# Patient Record
Sex: FEMALE | Race: OTHER | NOT HISPANIC OR LATINO
[De-identification: names, ages, dates, MRNs, and addresses within clinical notes are randomized per-mention and may not be internally consistent; named-entity substitution may affect disease eponyms.]

---

## 2019-02-12 ENCOUNTER — TRANSCRIPTION ENCOUNTER (OUTPATIENT)
Age: 64
End: 2019-02-12

## 2023-04-20 ENCOUNTER — APPOINTMENT (OUTPATIENT)
Dept: RHEUMATOLOGY | Facility: CLINIC | Age: 68
End: 2023-04-20
Payer: COMMERCIAL

## 2023-04-20 VITALS — SYSTOLIC BLOOD PRESSURE: 120 MMHG | DIASTOLIC BLOOD PRESSURE: 80 MMHG

## 2023-04-20 VITALS — WEIGHT: 160 LBS | BODY MASS INDEX: 26.66 KG/M2 | HEART RATE: 99 BPM | OXYGEN SATURATION: 98 % | HEIGHT: 65 IN

## 2023-04-20 DIAGNOSIS — G89.29 PAIN IN UNSPECIFIED HIP: ICD-10-CM

## 2023-04-20 DIAGNOSIS — Z82.61 FAMILY HISTORY OF ARTHRITIS: ICD-10-CM

## 2023-04-20 DIAGNOSIS — Z86.39 PERSONAL HISTORY OF OTHER ENDOCRINE, NUTRITIONAL AND METABOLIC DISEASE: ICD-10-CM

## 2023-04-20 DIAGNOSIS — M25.559 PAIN IN UNSPECIFIED HIP: ICD-10-CM

## 2023-04-20 DIAGNOSIS — Z78.9 OTHER SPECIFIED HEALTH STATUS: ICD-10-CM

## 2023-04-20 PROBLEM — Z00.00 ENCOUNTER FOR PREVENTIVE HEALTH EXAMINATION: Status: ACTIVE | Noted: 2023-04-20

## 2023-04-20 PROCEDURE — 99204 OFFICE O/P NEW MOD 45 MIN: CPT

## 2023-04-20 RX ORDER — ROSUVASTATIN CALCIUM 5 MG/1
5 TABLET, FILM COATED ORAL
Refills: 0 | Status: ACTIVE | COMMUNITY

## 2023-04-20 RX ORDER — VALSARTAN AND HYDROCHLOROTHIAZIDE 80; 12.5 MG/1; MG/1
80-12.5 TABLET, FILM COATED ORAL
Refills: 0 | Status: ACTIVE | COMMUNITY

## 2023-04-20 NOTE — HISTORY OF PRESENT ILLNESS
[FreeTextEntry1] : Around January 2023, pt began to experience episodes of severe pain in her low back radiating down both of her posterior legs simultaneously, which would improve after a few minutes if she stops to rest but have been becoming more frequent and occur several times daily. Sometimes trouble bending down due to the pain. The symptoms are worse with using stairs and being on pt's feet a lot. Also + pain in lateral thighs when pt turns in bed. + Intermittent muscle pains in proximal arms but those symptoms are not related to the legs and relatively mild. Pt denies numbness or tingling, swelling, diarrhea, rashes or eye problems. No known triggers for the symptoms.\par \par Previous treatments:\par - Advil 400 mg\par - BenGay\par - Tylenol\par \par Physical exam: GEN: Pleasant, AAO woman sitting on exam table in NAD\par HEAD: no alopecia\par SKIN: No rashes\par MOUTH: Moist mucous membranes, no oral ulcers, normal oral aperture\par PULM: Clear to auscultation b/l\par ENT: No LAD\par PULM: Clear to auscultation b/l\par CV: Regular rate and rhythm, no murmurs\par MSK:\par Shoulders: Full ROM b/l\par Elbows: Full ROM b/l, no effusions\par Wrists: Full ROM b/l, no effusions\par Hands: no synovitis\par Hips: + ? mildly limited internal rotation b/l, no pain with ROM, 5/5 MS b/l\par Knees: no effusions, full ROM b/l\par Ankles: no effusions, full ROM b/l\par Feet: no effusions, no TTP\par Back: No TTP, unable to assess straight leg raise as pt has vertigo and can't lie flat\par EXT: No LE edema b/l

## 2023-04-20 NOTE — ASSESSMENT
[FreeTextEntry1] : Low back pain radiating into legs: Suspect that pt's symptoms are most likely secondary to arthritis with associated muscle spasm. Sciatica is also a possibility, although pt's descriptions of her symptoms is more suggestive of muscle spasm. Herniated disc is also a possibility. Low suspicion for PMR as pt does not have any significant shoulder involvement. Would also like to rule out spine fractures, although low suspicion as pt does not have TTP on exam today. Pt also notes that she had a slightly elevated CK on prior labs; she tried holding her rosuvastatin with no improvement of symptoms.\par - f/u x-rays of L-spine, pelvis and hips\par - f/u labs for inflammatory arthritis and CK, although suspicion that this is an inflammatory issue based on pt's symptoms\par - Referred to physical therapy \par - Prescribed prn nabumetone 1000 mg q 12 hours\par - Prescribed prn cyclobenzaprine 5 mg TID, discussed adverse effects\par \par f/u in 2 months if no improvement, will call pt with test results

## 2023-04-20 NOTE — REASON FOR VISIT
[Initial Evaluation] : an initial evaluation [FreeTextEntry1] : Low back pain radiating into legs x 3 months

## 2023-07-24 ENCOUNTER — RX RENEWAL (OUTPATIENT)
Age: 68
End: 2023-07-24

## 2023-08-22 ENCOUNTER — RX RENEWAL (OUTPATIENT)
Age: 68
End: 2023-08-22

## 2023-09-05 ENCOUNTER — LABORATORY RESULT (OUTPATIENT)
Age: 68
End: 2023-09-05

## 2023-09-08 ENCOUNTER — NON-APPOINTMENT (OUTPATIENT)
Age: 68
End: 2023-09-08

## 2023-09-08 LAB
CCP AB SER IA-ACNC: <8 UNITS
CK SERPL-CCNC: 217 U/L
CRP SERPL-MCNC: <3 MG/L
ERYTHROCYTE [SEDIMENTATION RATE] IN BLOOD BY WESTERGREN METHOD: 21 MM/HR
RF+CCP IGG SER-IMP: NEGATIVE
RHEUMATOID FACT SER QL: 11 IU/ML

## 2023-09-11 LAB — HMGCR ANTIBODY, IGG: <3 UNITS

## 2023-09-14 ENCOUNTER — NON-APPOINTMENT (OUTPATIENT)
Age: 68
End: 2023-09-14

## 2023-09-18 ENCOUNTER — APPOINTMENT (OUTPATIENT)
Dept: RHEUMATOLOGY | Facility: CLINIC | Age: 68
End: 2023-09-18
Payer: COMMERCIAL

## 2023-09-18 VITALS
DIASTOLIC BLOOD PRESSURE: 84 MMHG | BODY MASS INDEX: 26.66 KG/M2 | HEART RATE: 92 BPM | OXYGEN SATURATION: 97 % | SYSTOLIC BLOOD PRESSURE: 130 MMHG | HEIGHT: 65 IN | WEIGHT: 160 LBS

## 2023-09-18 PROCEDURE — 99214 OFFICE O/P EST MOD 30 MIN: CPT

## 2023-09-18 RX ORDER — CYCLOBENZAPRINE HYDROCHLORIDE 5 MG/1
5 TABLET, FILM COATED ORAL 3 TIMES DAILY
Qty: 63 | Refills: 1 | Status: DISCONTINUED | COMMUNITY
Start: 2023-04-20 | End: 2023-09-18

## 2023-09-18 RX ORDER — NABUMETONE 500 MG/1
500 TABLET, FILM COATED ORAL
Qty: 120 | Refills: 0 | Status: DISCONTINUED | COMMUNITY
Start: 2023-04-20 | End: 2023-09-18

## 2023-11-02 ENCOUNTER — APPOINTMENT (OUTPATIENT)
Dept: RHEUMATOLOGY | Facility: CLINIC | Age: 68
End: 2023-11-02

## 2023-11-30 DIAGNOSIS — I10 ESSENTIAL (PRIMARY) HYPERTENSION: ICD-10-CM

## 2024-02-21 ENCOUNTER — APPOINTMENT (OUTPATIENT)
Dept: RHEUMATOLOGY | Facility: CLINIC | Age: 69
End: 2024-02-21

## 2024-04-11 ENCOUNTER — APPOINTMENT (OUTPATIENT)
Dept: RHEUMATOLOGY | Facility: CLINIC | Age: 69
End: 2024-04-11
Payer: COMMERCIAL

## 2024-04-11 VITALS
BODY MASS INDEX: 24.99 KG/M2 | WEIGHT: 150 LBS | OXYGEN SATURATION: 94 % | TEMPERATURE: 98 F | HEART RATE: 74 BPM | DIASTOLIC BLOOD PRESSURE: 80 MMHG | HEIGHT: 65 IN | SYSTOLIC BLOOD PRESSURE: 124 MMHG

## 2024-04-11 DIAGNOSIS — M79.606 PAIN IN LEG, UNSPECIFIED: ICD-10-CM

## 2024-04-11 DIAGNOSIS — M54.50 LOW BACK PAIN, UNSPECIFIED: ICD-10-CM

## 2024-04-11 PROCEDURE — 99214 OFFICE O/P EST MOD 30 MIN: CPT

## 2024-04-11 RX ORDER — DICLOFENAC SODIUM 75 MG/1
75 TABLET, DELAYED RELEASE ORAL
Qty: 60 | Refills: 3 | Status: ACTIVE | COMMUNITY
Start: 2024-04-11 | End: 1900-01-01

## 2024-04-11 RX ORDER — GABAPENTIN 100 MG/1
100 CAPSULE ORAL
Qty: 90 | Refills: 5 | Status: ACTIVE | COMMUNITY
Start: 2024-04-11 | End: 1900-01-01

## 2024-04-11 RX ORDER — GABAPENTIN 100 MG/1
100 CAPSULE ORAL
Qty: 90 | Refills: 5 | Status: DISCONTINUED | COMMUNITY
Start: 2024-04-11 | End: 2024-04-11

## 2024-04-11 NOTE — HISTORY OF PRESENT ILLNESS
[FreeTextEntry1] : Pt continues to experience severe pain radiating from her buttocks to b/l posterior legs, worse with walking or prolonged sitting. She tried the duloxetine but it didn't help so she stopped taking it after several months. Also tried chiropractor with no improvement. She went to pain management and was advised to have epidural injections but pt is hesitant because she was told she would likely need several injections before she sees an improvement.  Previous HPI: Around January 2023, pt began to experience episodes of severe pain in her low back radiating down both of her posterior legs simultaneously, which would improve after a few minutes if she stops to rest but have been becoming more frequent and occur several times daily. Sometimes trouble bending down due to the pain. The symptoms are worse with using stairs and being on pt's feet a lot. Also + pain in lateral thighs when pt turns in bed. + Intermittent muscle pains in proximal arms but those symptoms are not related to the legs and relatively mild. Pt denies numbness or tingling, swelling, diarrhea, rashes or eye problems. No known triggers for the symptoms.  Previous treatments: - Advil 400 mg - BenGay - Tylenol - Duloxetine - no improvement - Nabumetone - no improvement - Chiropractor - no improvement - Saw pain management but is hesitant about going for pain management procedures because she was told she would likely need multiple injections  Physical exam: GEN: Pleasant, AAO woman sitting on exam table in NAD HEAD: no alopecia SKIN: No rashes MOUTH: Moist mucous membranes, no oral ulcers, normal oral aperture PULM: Clear to auscultation b/l ENT: No LAD PULM: Clear to auscultation b/l CV: Regular rate and rhythm, no murmurs MSK: Shoulders: Full ROM b/l Elbows: Full ROM b/l, no effusions Wrists: Full ROM b/l, no effusions Hands: no synovitis Hips: + ? mildly limited internal rotation b/l, no pain with ROM, 5/5 MS b/l Knees: no effusions, full ROM b/l Ankles: no effusions, full ROM b/l Feet: no effusions, no TTP Back: No TTP, unable to assess straight leg raise as pt has vertigo and can't lie flat EXT: No LE edema b/l

## 2024-04-11 NOTE — ASSESSMENT
[FreeTextEntry1] : Low back pain radiating into legs: Suspect that pt's symptoms are most likely secondary to arthritis with associated muscle spasm. s/p x-rays of L-spine demonstrating severe degenerative disc changes in the L5-S1 level, with unremarkable hip x-rays and labs with no e/o inflammatory arthritis.  - Referred to PT  - Start gabapentin 100 mg qhs, I advised pt she can increase by 100 mg every 3 days to 300 mg qhs if she does not experience drowsiness - Start prn diclofenac 75 mg BID - Pt will also try massage and Stretch Lab - Can consider trying Lyrica if the above treatments don't work. Pt is >65 so amitriptyline is not a good option for her  f/u in 2-3 months

## 2024-06-21 ENCOUNTER — APPOINTMENT (OUTPATIENT)
Dept: RHEUMATOLOGY | Facility: CLINIC | Age: 69
End: 2024-06-21

## 2025-05-24 ENCOUNTER — NON-APPOINTMENT (OUTPATIENT)
Age: 70
End: 2025-05-24